# Patient Record
Sex: FEMALE | Race: WHITE | Employment: UNEMPLOYED | URBAN - METROPOLITAN AREA
[De-identification: names, ages, dates, MRNs, and addresses within clinical notes are randomized per-mention and may not be internally consistent; named-entity substitution may affect disease eponyms.]

---

## 2021-08-23 ENCOUNTER — OFFICE VISIT (OUTPATIENT)
Dept: HEMATOLOGY | Age: 56
End: 2021-08-23
Payer: COMMERCIAL

## 2021-08-23 ENCOUNTER — HOSPITAL ENCOUNTER (OUTPATIENT)
Dept: LAB | Age: 56
Discharge: HOME | End: 2021-08-23
Payer: COMMERCIAL

## 2021-08-23 VITALS
BODY MASS INDEX: 26.96 KG/M2 | OXYGEN SATURATION: 96 % | DIASTOLIC BLOOD PRESSURE: 88 MMHG | WEIGHT: 146.5 LBS | HEIGHT: 62 IN | TEMPERATURE: 98.2 F | HEART RATE: 67 BPM | SYSTOLIC BLOOD PRESSURE: 123 MMHG

## 2021-08-23 DIAGNOSIS — R74.8 ELEVATED LIVER ENZYMES: Primary | ICD-10-CM

## 2021-08-23 DIAGNOSIS — R74.8 ELEVATED LIVER ENZYMES: ICD-10-CM

## 2021-08-23 PROBLEM — F14.90 COCAINE USE: Status: ACTIVE | Noted: 2021-08-23

## 2021-08-23 PROBLEM — Z98.890 H/O CERVICAL SPINE SURGERY: Status: ACTIVE | Noted: 2021-08-23

## 2021-08-23 PROBLEM — F32.A DEPRESSION: Status: ACTIVE | Noted: 2021-08-23

## 2021-08-23 PROBLEM — Z90.710 S/P TAH (TOTAL ABDOMINAL HYSTERECTOMY): Status: ACTIVE | Noted: 2021-08-23

## 2021-08-23 PROBLEM — G90.50 RSD (REFLEX SYMPATHETIC DYSTROPHY): Status: ACTIVE | Noted: 2021-08-23

## 2021-08-23 PROBLEM — F41.9 ANXIETY: Status: ACTIVE | Noted: 2021-08-23

## 2021-08-23 PROBLEM — K90.0 CELIAC DISEASE: Status: ACTIVE | Noted: 2021-08-23

## 2021-08-23 PROBLEM — Z98.890 H/O ACHILLES TENDON REPAIR: Status: ACTIVE | Noted: 2021-08-23

## 2021-08-23 LAB
ALBUMIN SERPL-MCNC: 4.2 G/DL (ref 3.4–5)
ALBUMIN/GLOB SERPL: 1.4 {RATIO} (ref 0.8–1.7)
ALP SERPL-CCNC: 75 U/L (ref 45–117)
ALT SERPL-CCNC: 30 U/L (ref 13–56)
ANION GAP SERPL CALC-SCNC: 5 MMOL/L (ref 3–18)
AST SERPL-CCNC: 118 U/L (ref 10–38)
BASOPHILS # BLD: 0 K/UL (ref 0–0.1)
BASOPHILS NFR BLD: 0 % (ref 0–2)
BILIRUB DIRECT SERPL-MCNC: <0.1 MG/DL (ref 0–0.2)
BILIRUB SERPL-MCNC: 0.4 MG/DL (ref 0.2–1)
BUN SERPL-MCNC: 12 MG/DL (ref 7–18)
BUN/CREAT SERPL: 15 (ref 12–20)
CALCIUM SERPL-MCNC: 9.5 MG/DL (ref 8.5–10.1)
CHLORIDE SERPL-SCNC: 105 MMOL/L (ref 100–111)
CK SERPL-CCNC: 93 U/L (ref 26–192)
CO2 SERPL-SCNC: 28 MMOL/L (ref 21–32)
CREAT SERPL-MCNC: 0.8 MG/DL (ref 0.6–1.3)
DIFFERENTIAL METHOD BLD: ABNORMAL
EOSINOPHIL # BLD: 0.2 K/UL (ref 0–0.4)
EOSINOPHIL NFR BLD: 3 % (ref 0–5)
ERYTHROCYTE [DISTWIDTH] IN BLOOD BY AUTOMATED COUNT: 15.9 % (ref 11.6–14.5)
FERRITIN SERPL-MCNC: 25 NG/ML (ref 8–388)
GLOBULIN SER CALC-MCNC: 3 G/DL (ref 2–4)
GLUCOSE SERPL-MCNC: 82 MG/DL (ref 74–99)
HCT VFR BLD AUTO: 45.9 % (ref 35–45)
HGB BLD-MCNC: 15 G/DL (ref 12–16)
IRON SATN MFR SERPL: 65 % (ref 20–50)
IRON SERPL-MCNC: 218 UG/DL (ref 50–175)
LYMPHOCYTES # BLD: 2.4 K/UL (ref 0.9–3.6)
LYMPHOCYTES NFR BLD: 31 % (ref 21–52)
MCH RBC QN AUTO: 29.9 PG (ref 24–34)
MCHC RBC AUTO-ENTMCNC: 32.7 G/DL (ref 31–37)
MCV RBC AUTO: 91.6 FL (ref 74–97)
MONOCYTES # BLD: 0.8 K/UL (ref 0.05–1.2)
MONOCYTES NFR BLD: 10 % (ref 3–10)
NEUTS SEG # BLD: 4.3 K/UL (ref 1.8–8)
NEUTS SEG NFR BLD: 55 % (ref 40–73)
PLATELET # BLD AUTO: 318 K/UL (ref 135–420)
PMV BLD AUTO: 10.5 FL (ref 9.2–11.8)
POTASSIUM SERPL-SCNC: 4.2 MMOL/L (ref 3.5–5.5)
PROT SERPL-MCNC: 7.2 G/DL (ref 6.4–8.2)
RBC # BLD AUTO: 5.01 M/UL (ref 4.2–5.3)
SODIUM SERPL-SCNC: 138 MMOL/L (ref 136–145)
TIBC SERPL-MCNC: 336 UG/DL (ref 250–450)
WBC # BLD AUTO: 7.8 K/UL (ref 4.6–13.2)

## 2021-08-23 PROCEDURE — 86038 ANTINUCLEAR ANTIBODIES: CPT

## 2021-08-23 PROCEDURE — 86706 HEP B SURFACE ANTIBODY: CPT

## 2021-08-23 PROCEDURE — 86708 HEPATITIS A ANTIBODY: CPT

## 2021-08-23 PROCEDURE — 86803 HEPATITIS C AB TEST: CPT

## 2021-08-23 PROCEDURE — 82728 ASSAY OF FERRITIN: CPT

## 2021-08-23 PROCEDURE — 82103 ALPHA-1-ANTITRYPSIN TOTAL: CPT

## 2021-08-23 PROCEDURE — 82550 ASSAY OF CK (CPK): CPT

## 2021-08-23 PROCEDURE — 86704 HEP B CORE ANTIBODY TOTAL: CPT

## 2021-08-23 PROCEDURE — 80307 DRUG TEST PRSMV CHEM ANLYZR: CPT

## 2021-08-23 PROCEDURE — 80076 HEPATIC FUNCTION PANEL: CPT

## 2021-08-23 PROCEDURE — 99203 OFFICE O/P NEW LOW 30 MIN: CPT | Performed by: INTERNAL MEDICINE

## 2021-08-23 PROCEDURE — 36415 COLL VENOUS BLD VENIPUNCTURE: CPT

## 2021-08-23 PROCEDURE — 80349 CANNABINOIDS NATURAL: CPT

## 2021-08-23 PROCEDURE — 83540 ASSAY OF IRON: CPT

## 2021-08-23 PROCEDURE — 80048 BASIC METABOLIC PNL TOTAL CA: CPT

## 2021-08-23 PROCEDURE — 83516 IMMUNOASSAY NONANTIBODY: CPT

## 2021-08-23 PROCEDURE — 85025 COMPLETE CBC W/AUTO DIFF WBC: CPT

## 2021-08-23 PROCEDURE — 80353 DRUG SCREENING COCAINE: CPT

## 2021-08-23 PROCEDURE — 87340 HEPATITIS B SURFACE AG IA: CPT

## 2021-08-23 RX ORDER — ESOMEPRAZOLE MAGNESIUM 40 MG/1
40 CAPSULE, DELAYED RELEASE ORAL DAILY
COMMUNITY

## 2021-08-23 RX ORDER — CITALOPRAM 20 MG/1
20 TABLET, FILM COATED ORAL DAILY
COMMUNITY

## 2021-08-23 RX ORDER — PRAMIPEXOLE DIHYDROCHLORIDE 1 MG/1
TABLET ORAL
COMMUNITY
Start: 2021-07-20

## 2021-08-23 NOTE — PROGRESS NOTES
Keegan Roberts MD, Suzanne Urbano MD, MPH      Lella Siemens, PALISSETH Cisneros, ACNP-BC     Willow Deluna, AGPCNP-BC   Houstonconnie Wallace, FNP-SURENDRA Clarke, [facility]       Sina Haji Jayesh De Chang 136    at [address], [address] Areli Greenwood  22.    [phone]    FAX: [address] [place] [address]    at [address], [address], [address]    [phone] 35[facility]: [phone]       Patient Care Team:  Ian Merlin, MD as PCP - General (Family Medicine)      Problem List  Date Reviewed: 8/23/2021        Codes Class Noted    Cocaine use ICD-10-CM: F14.90  ICD-9-CM: 305.60  8/23/2021        Elevated liver enzymes ICD-10-CM: R74.8  ICD-9-CM: 790.5  8/23/2021        Celiac disease ICD-10-CM: K90.0  ICD-9-CM: 579.0  8/23/2021        Anxiety ICD-10-CM: F41.9  ICD-9-CM: 300.00  8/23/2021        Depression ICD-10-CM: F32.9  ICD-9-CM: 311  8/23/2021        RSD (reflex sympathetic dystrophy) ICD-10-CM: G90.50  ICD-9-CM: 337.20  8/23/2021        H/O cervical spine surgery ICD-10-CM: Z98.890  ICD-9-CM: V45.89  8/23/2021        H/O Achilles tendon repair ICD-10-CM: Y42.419  ICD-9-CM: V45.89  8/23/2021        S/P ERIKA (total abdominal hysterectomy) ICD-10-CM: Z90.710  ICD-9-CM: V88.01  8/23/2021              The clinicians listed above have asked me to see Tejas Ramachandran in consultation regarding elevated liver enzymes and its management. All medical records sent by the referring physicians were reviewed including imaging studies     The patient is a 48 y.o.  female who was found to have elevated liver transaminases in 10/2020.       Serologic evaluation for markers of chronic liver disease has either not been performed or the results are not available. She has been caring for her ill mother in hospice. Because of the stress of this she utilized Cocaine, THC, and metamphetamines. Ultrasound of the liver was performed in 5/2021. The results of the imaging demonstrated a normal appearing liver. An assessment of liver fibrosis with biopsy, Fibroscan or elastography has not been performed. The patient has the following symptoms which could be attributed to the liver disorder:    fatigue,   fevers,   pain in the right side over the liver,     The patient is not experiencing the following symptoms which are commonly seen in this liver disorder:   nausea,   vomiting,     The patient has Mild limitations in functional activities which can be attributed to the liver disease       ASSESSMENT AND PLAN:  Elevated liver enzymes  Persistent elevation in liver transaminases of unclear etiology at this time. Have performed laboratory testing to monitor liver function and degree of liver injury. This included BMP, hepatic panel, CBC with platelet count, INR. AST is elevated. ALT is normal.  ALP is normal.  Liver function is normal.  The platelet count is   normal.      Based upon laboratory studies and imaging the patient does not appear to have significant liver injury. Serologic testing for causes of chronic liver disease was ordered. All was negative     The need to perform an assessment of liver fibrosis was discussed with the patient. The Fibroscan can assess liver fibrosis and determine if a patient has advanced fibrosis or cirrhosis without the need for liver biopsy. This will be performed at the next office visit. If the Fibroscan suggests advanced fibrosis then a liver biopsy should be considered. The Fibroscan can be repeated annually or as often as clinically indicated to assess for fibrosis progression and/or regression.     If the Fibroscan value is low and the liver enzymes remain intermittently or become persistently elevated and /or the Fibroscan increases over the next 1-2 years than a liver biopsy should be considered. Screening for Hepatocellular Carcinoma  HCC screening is not necessary if the patient has no evidence of cirrhosis. Treatment of other medical problems in patients with chronic liver disease  There are no contraindications for the patient to take most medications that are necessary for treatment of other medical issues. Counseling for alcohol in patients with chronic liver disease  The patient was counseled regarding alcohol consumption and the effect of alcohol on chronic liver disease. The patient does not consume any significant amount of alcohol. Vaccinations   Vaccination for viral hepatitis A and B is recommended since the patient has no serologic evidence of previous exposure or vaccination with immunity. Routine vaccinations against other bacterial and viral agents can be performed as indicated. Annual flu vaccination should be administered if indicated. ALLERGIES  Allergies   Allergen Reactions    Gluten Other (comments)     Celiac's disease    Sulfa (Sulfonamide Antibiotics) Swelling       MEDICATIONS  Current Outpatient Medications   Medication Sig    esomeprazole (NEXIUM) 40 mg capsule Take 40 mg by mouth daily.  citalopram (CELEXA) 20 mg tablet Take 20 mg by mouth daily.  pramipexole (MIRAPEX) 1 mg tablet TAKE 1/2 TABLET BY MOUTH AT BEDTIME    linaclotide (LINZESS PO) Take  by mouth. No current facility-administered medications for this visit. SYSTEM REVIEW NOT RELATED TO LIVER DISEASE OR REVIEWED ABOVE:  Constitution systems: Negative for fever, chills, weight gain, weight loss. Eyes: Negative for visual changes. ENT: Negative for sore throat, painful swallowing. Respiratory: Negative for cough, hemoptysis, SOB. Cardiology: Negative for chest pain, palpitations. GI:  Negative for constipation or diarrhea.   : Negative for urinary frequency, dysuria, hematuria, nocturia. Skin: Negative for rash. Hematology: Negative for easy bruising, blood clots. Musculo-skelatal: Negative for back pain, muscle pain, weakness. Neurologic: Negative for headaches, dizziness, vertigo, memory problems not related to HE. Psychology: Negative for anxiety, depression. FAMILY HISTORY:  The father  of lymphoma. The mother  of COPD. There is no family history of liver disease. SOCIAL HISTORY:  The patient is . The patient has 2 children, and 2 grandchildren. The patient currently smokes 1 pack of tobacco daily. The patient has never consumed significant amounts of alcohol. The patient does not work outside the home. PHYSICAL EXAMINATION:  Visit Vitals  /88   Pulse 67   Temp 98.2 °F (36.8 °C) (Tympanic)   Ht 5' 2\" (1.575 m)   Wt 146 lb 8 oz (66.5 kg)   SpO2 96%   BMI 26.80 kg/m²     General: No acute distress. Eyes: Sclera anicteric. ENT: No oral lesions. Thyroid normal.  Nodes: No adenopathy. Skin: No spider angiomata. No jaundice. No palmar erythema. Respiratory: Lungs clear to auscultation. Cardiovascular: Regular heart rate. No murmurs. No JVD. Abdomen: Soft non-tender. Liver size normal to percussion/palpation. Spleen not palpable. No obvious ascites. Extremities: No edema. No muscle wasting. No gross arthritic changes. Neurologic: Alert and oriented. Cranial nerves grossly intact. No asterixis.     LABORATORY STUDIES:  Liver [place] of [address] Units 2021   WBC 4.6 - 13.2 K/uL 7.8   ANC 1.8 - 8.0 K/UL 4.3   HGB 12.0 - 16.0 g/dL 15.0    - 420 K/uL 318   AST 10 - 38 U/L 118 (H)   ALT 13 - 56 U/L 30   Alk Phos 45 - 117 U/L 75   Bili, Total 0.2 - 1.0 MG/DL 0.4   Bili, Direct 0.0 - 0.2 MG/DL <0.1   Albumin 3.4 - 5.0 g/dL 4.2   BUN 7.0 - 18 MG/DL 12   Creat 0.6 - 1.3 MG/DL 0.80   Na 136 - 145 mmol/L 138   K 3.5 - 5.5 mmol/L 4.2   Cl 100 - 111 mmol/L 105 CO2 21 - 32 mmol/L 28   Glucose 74 - 99 mg/dL 82     SEROLOGIES:  Serologies Latest Ref Rng & Units 8/23/2021   Hep A Ab, Total Negative   Negative   Hep B Surface Ag <1.00 Index <0.10   Hep B Surface Ag Interp NEG   Negative   Hep B Core Ab, Total Negative   Negative   Hep B Surface Ab >10.0 mIU/mL <3.10 (L)   Hep B Surface Ab Interp POS   Negative (A)   Hep C Ab 0.0 - 0.9 s/co ratio <0.1   Ferritin 8 - 388 NG/ML 25   Iron % Saturation 20 - 50 % 65 (H)   DOMITILA, IFA  Positive (A)   DOMITILA Pattern  1:80   ASMCA 0 - 19 Units 7   Alpha-1 antitrypsin level 101 - 187 mg/dL 138     LIVER HISTOLOGY:  Not available or performed    ENDOSCOPIC PROCEDURES:  Not available or performed    RADIOLOGY:  5/2021. Ultrasound of liver. Normal appearing liver. No liver mass lesions. OTHER TESTING:  Not available or performed    FOLLOW-UP:  All of the issues listed above in the Assessment and Plan were discussed with the patient. All questions were answered. The patient expressed a clear understanding of the above. [facility] 87 in 4 weeks for Fibroscan to review all data and determine the treatment plan.       Kris Guaman MD  [address]  [address] [address] [place] Coco Gilbert, [address]  12 [facility]

## 2021-08-23 NOTE — Clinical Note
9/13/2021    Patient: Charu Garcia   YOB: 1965   Date of Visit: 8/23/2021     Mahogany Tate MD  [facility] [address]  Via Fax: [phone]    Dear Mahogany Tate MD,      Thank you for referring Ms. Oleg Scott to [address] [place] [address],[address] for evaluation. My notes for this consultation are attached. If you have questions, please do not hesitate to call me. I look forward to following your patient along with you.       Sincerely,    Osborn Dancer, MD

## 2021-08-24 LAB
A1AT SERPL-MCNC: 138 MG/DL (ref 101–187)
HAV AB SER QL IA: NEGATIVE
HBV CORE AB SERPL QL IA: NEGATIVE
HBV SURFACE AB SER QL IA: NEGATIVE
HBV SURFACE AB SERPL IA-ACNC: <3.1 MIU/ML
HBV SURFACE AG SER QL: <0.1 INDEX
HBV SURFACE AG SER QL: NEGATIVE
HEP BS AB COMMENT,HBSAC: ABNORMAL

## 2021-08-25 LAB
ACTIN IGG SERPL-ACNC: 7 UNITS (ref 0–19)
ANA HOMOGEN TITR SER: NORMAL {TITER}
ANA TITR SER IF: POSITIVE {TITER}
HCV AB S/CO SERPL IA: <0.1 S/CO RATIO (ref 0–0.9)
HCV AB SERPL QL IA: NORMAL
NOTE:, 016270: NORMAL

## 2021-09-03 LAB
AMPHETAMINES SERPL QL SCN: NEGATIVE NG/ML
BARBITURATES SERPL QL SCN: NEGATIVE UG/ML
BENZOYLECGONINE, 808841: 381 NG/ML
CANNABIDIOL: NEGATIVE NG/ML
CANNABINOIDS CONFIRM: POSITIVE
CANNABINOIDS SERPL QL SCN: ABNORMAL NG/ML
CANNABINOL: NEGATIVE NG/ML
CARBOXY THC, 767669: 38.9 NG/ML
COCAINE CONFIRMATION,COCC: POSITIVE
COCAINE+BZE SERPL QL SCN: ABNORMAL NG/ML
COCAINE, 808839: NEGATIVE NG/ML
HYDROXY-THC: 1.6 NG/ML
OPIATES SERPL QL SCN: NEGATIVE NG/ML
OXYCODONE, 790407: NEGATIVE NG/ML
PCP SERPL QL SCN: NEGATIVE NG/ML
TETRAHYDROCANNABINOL, 809348: 3.3 NG/ML

## 2021-12-06 ENCOUNTER — OFFICE VISIT (OUTPATIENT)
Dept: HEMATOLOGY | Age: 56
End: 2021-12-06
Payer: COMMERCIAL

## 2021-12-06 VITALS
WEIGHT: 151 LBS | DIASTOLIC BLOOD PRESSURE: 62 MMHG | HEIGHT: 62 IN | HEART RATE: 64 BPM | RESPIRATION RATE: 16 BRPM | SYSTOLIC BLOOD PRESSURE: 109 MMHG | OXYGEN SATURATION: 98 % | BODY MASS INDEX: 27.79 KG/M2 | TEMPERATURE: 98 F

## 2021-12-06 DIAGNOSIS — R74.8 ELEVATED LIVER ENZYMES: Primary | ICD-10-CM

## 2021-12-06 PROCEDURE — 91200 LIVER ELASTOGRAPHY: CPT | Performed by: NURSE PRACTITIONER

## 2021-12-06 PROCEDURE — 99213 OFFICE O/P EST LOW 20 MIN: CPT | Performed by: NURSE PRACTITIONER

## 2021-12-06 NOTE — PROGRESS NOTES
Herman Rose MD, [place], Cite Flaco Bryan, Brien Quiroz MD, MPH      Hayder Webb, PA-SURENDRA Wilson, [facility]     Willow Deluna, [facility]   Rosa Du P-SURENDRA Haley, [facility]       Sina Haji Jayesh De Chang 136    at [address], [facility] Areli Greenwood  22. [phone]    FAX: [address] [place] [address]    at [address], [address]    [phone]    FAX: [phone]       Patient Care Team:  Kash Lovett MD as PCP - General (Family Medicine)      Problem List  Date Reviewed: 9/13/2021          Codes Class Noted    Cocaine use ICD-10-CM: F14.90  ICD-9-CM: 305.60  8/23/2021        Elevated liver enzymes ICD-10-CM: R74.8  ICD-9-CM: 790.5  8/23/2021        Celiac disease ICD-10-CM: K90.0  ICD-9-CM: 579.0  8/23/2021        Anxiety ICD-10-CM: F41.9  ICD-9-CM: 300.00  8/23/2021        Depression ICD-10-CM: F32. 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 The active problem list, all pertinent past medical history, medications, liver histology, radiologic findings, and laboratory findings related to the liver disorder were reviewed and discussed with the patient. The patient is a 48 y.o. female who was found to have elevated liver transaminases in 10/2020.       Serologic evaluation for markers of chronic liver disease was positive for DOMITILA. Ultrasound of the liver was performed in 5/2021. The results of the imaging demonstrated a normal appearing liver. Assessment of liver fibrosis with Fibroscan was performed in the office today. The result was 4.2 kPa which correlates with no fibrosis. The CAP score of 216 does not suggest hepatic steatosis. The patient has the following symptoms which could be attributed to the liver disorder:  fatigue, pain in the right side over the liver    The patient is not experiencing the following symptoms which are commonly seen in this liver disorder:   nausea, vomiting    The patient has mild limitations in functional activities which can be attributed to the liver disease       ASSESSMENT AND PLAN:  Elevated liver enzymes  Persistent elevation in liver transaminases of unclear etiology at this time. Will perform laboratory testing to monitor liver function and degree of liver injury. This included BMP, hepatic panel, CBC with platelet count, INR. AST is elevated. ALT is normal. ALP is normal. Liver function is normal. The platelet count is normal.      Based upon laboratory studies and imaging the patient does not appear to have significant liver injury. Serologic testing for causes of chronic liver disease was ordered. Positive DOMITILA. The need to perform an assessment of liver fibrosis was discussed with the patient. The Fibroscan can assess liver fibrosis and determine if a patient has advanced fibrosis or cirrhosis without the need for liver biopsy. The Fibroscan can be repeated annually or as often as clinically indicated to assess for fibrosis progression and/or regression. If the Fibroscan value is low and the liver enzymes remain intermittently or become persistently elevated and /or the Fibroscan increases over the next 1-2 years than a liver biopsy should be considered.     Screening for Hepatocellular Carcinoma  HCC screening is not necessary if the patient has no evidence of cirrhosis. Treatment of other medical problems in patients with chronic liver disease  There are no contraindications for the patient to take most medications that are necessary for treatment of other medical issues. Counseling for alcohol in patients with chronic liver disease  The patient was counseled regarding alcohol consumption and the effect of alcohol on chronic liver disease. The patient does not consume any significant amount of alcohol. Vaccinations   Vaccination for viral hepatitis A and B is recommended since the patient has no serologic evidence of previous exposure or vaccination with immunity. Routine vaccinations against other bacterial and viral agents can be performed as indicated. Annual flu vaccination should be administered if indicated. ALLERGIES  Allergies   Allergen Reactions    Gluten Other (comments)     Celiac's disease    Sulfa (Sulfonamide Antibiotics) Swelling       MEDICATIONS  Current Outpatient Medications   Medication Sig    esomeprazole (NEXIUM) 40 mg capsule Take 40 mg by mouth daily.  citalopram (CELEXA) 20 mg tablet Take 20 mg by mouth daily.  pramipexole (MIRAPEX) 1 mg tablet TAKE 1/2 TABLET BY MOUTH AT BEDTIME    linaclotide (LINZESS PO) Take  by mouth. No current facility-administered medications for this visit. SYSTEM REVIEW NOT RELATED TO LIVER DISEASE OR REVIEWED ABOVE:  Constitution systems: Negative for fever, chills, weight gain, weight loss. Eyes: Negative for visual changes. ENT: Negative for sore throat, painful swallowing. Respiratory: Negative for cough, hemoptysis, SOB. Cardiology: Negative for chest pain, palpitations. GI:  Negative for constipation or diarrhea. : Negative for urinary frequency, dysuria, hematuria, nocturia. Skin: Negative for rash. Hematology: Negative for easy bruising, blood clots.     Musculo-skelatal: Negative for back pain, muscle pain, weakness. Neurologic: Negative for headaches, dizziness, vertigo, memory problems not related to HE. Psychology: Negative for anxiety, depression. FAMILY HISTORY:  The father  of lymphoma. The mother  of COPD. There is no family history of liver disease. SOCIAL HISTORY:  The patient is . The patient has 2 children, and 2 grandchildren. The patient currently smokes 1 pack of tobacco daily. The patient has never consumed significant amounts of alcohol. The patient does not work outside the home. PHYSICAL EXAMINATION:  Visit Vitals  /62 (BP 1 Location: Left upper arm, BP Patient Position: Sitting, BP Cuff Size: Small adult)   Pulse 64   Temp 98 °F (36.7 °C)   Resp 16   Ht 5' 2\" (1.575 m)   Wt 151 lb (68.5 kg)   SpO2 98%   BMI 27.62 kg/m²     General: No acute distress. Eyes: Sclera anicteric. ENT: No oral lesions. Thyroid normal.  Nodes: No adenopathy. Skin: No spider angiomata. No jaundice. No palmar erythema. Respiratory: Lungs clear to auscultation. Cardiovascular: Regular heart rate. No murmurs. No JVD. Abdomen: Soft non-tender. Liver size normal to percussion/palpation. Spleen not palpable. No obvious ascites. Extremities: No edema. No muscle wasting. No gross arthritic changes. Neurologic: Alert and oriented. Cranial nerves grossly intact. No asterixis.     LABORATORY STUDIES:  Liver [place] of [address] [place] [address] 2021   WBC 4.6 - 13.2 K/uL 7.8   ANC 1.8 - 8.0 K/UL 4.3   HGB 12.0 - 16.0 g/dL 15.0    - 420 K/uL 318   AST 10 - 38 U/L 118 (H)   ALT 13 - 56 U/L 30   Alk Phos 45 - 117 U/L 75   Bili, Total 0.2 - 1.0 MG/DL 0.4   Bili, Direct 0.0 - 0.2 MG/DL <0.1   Albumin 3.4 - 5.0 g/dL 4.2   BUN 7.0 - 18 MG/DL 12   Creat 0.6 - 1.3 MG/DL 0.80   Na 136 - 145 mmol/L 138   K 3.5 - 5.5 mmol/L 4.2   Cl 100 - 111 mmol/L 105   CO2 21 - 32 mmol/L 28   Glucose 74 - 99 mg/dL 82     SEROLOGIES:  Serologies Latest Ref Rng & Units 8/23/2021   Hep A Ab, Total Negative   Negative   Hep B Surface Ag <1.00 Index <0.10   Hep B Surface Ag Interp NEG   Negative   Hep B Core Ab, Total Negative   Negative   Hep B Surface Ab >10.0 mIU/mL <3.10 (L)   Hep B Surface Ab Interp POS   Negative (A)   Hep C Ab 0.0 - 0.9 s/co ratio <0.1   Ferritin 8 - 388 NG/ML 25   Iron % Saturation 20 - 50 % 65 (H)   DOMITILA, IFA  Positive (A)   DOMITILA Pattern  1:80   ASMCA 0 - 19 Units 7   Alpha-1 antitrypsin level 101 - 187 mg/dL 138     LIVER HISTOLOGY:  12/2021. FibroScan performed at [address]. EkPa was 4.2. IQR/med 12%. . The results suggested a fibrosis level of F0. The CAP score suggests there is no significant hepatic steatosis. ENDOSCOPIC PROCEDURES:  Not available or performed    RADIOLOGY:  5/2021. Ultrasound of liver. Normal appearing liver. No liver mass lesions. OTHER TESTING:  Not available or performed    FOLLOW-UP:  All of the issues listed above in the Assessment and Plan were discussed with the patient. All questions were answered. The patient expressed a clear understanding of the above. [facility] 87 in 6 months for monitoring.        Judge Medley, ANAMARIA-BC  [address] [place] [facility] [place] [address]  [facility], [address]  [phone]

## 2022-03-18 PROBLEM — Z98.890 H/O CERVICAL SPINE SURGERY: Status: ACTIVE | Noted: 2021-08-23

## 2022-03-18 PROBLEM — F32.A DEPRESSION: Status: ACTIVE | Noted: 2021-08-23

## 2022-03-18 PROBLEM — K90.0 CELIAC DISEASE: Status: ACTIVE | Noted: 2021-08-23

## 2022-03-18 PROBLEM — F41.9 ANXIETY: Status: ACTIVE | Noted: 2021-08-23

## 2022-03-19 PROBLEM — Z98.890 H/O ACHILLES TENDON REPAIR: Status: ACTIVE | Noted: 2021-08-23

## 2022-03-19 PROBLEM — F14.90 COCAINE USE: Status: ACTIVE | Noted: 2021-08-23

## 2022-03-19 PROBLEM — R74.8 ELEVATED LIVER ENZYMES: Status: ACTIVE | Noted: 2021-08-23

## 2022-03-19 PROBLEM — Z90.710 S/P TAH (TOTAL ABDOMINAL HYSTERECTOMY): Status: ACTIVE | Noted: 2021-08-23

## 2022-03-20 PROBLEM — G90.50 RSD (REFLEX SYMPATHETIC DYSTROPHY): Status: ACTIVE | Noted: 2021-08-23

## 2023-04-12 ENCOUNTER — OFFICE VISIT (OUTPATIENT)
Age: 58
End: 2023-04-12
Payer: COMMERCIAL

## 2023-04-12 VITALS
BODY MASS INDEX: 27.04 KG/M2 | HEART RATE: 84 BPM | SYSTOLIC BLOOD PRESSURE: 123 MMHG | RESPIRATION RATE: 16 BRPM | DIASTOLIC BLOOD PRESSURE: 72 MMHG | HEIGHT: 63 IN | WEIGHT: 152.6 LBS | OXYGEN SATURATION: 98 %

## 2023-04-12 DIAGNOSIS — R74.8 ELEVATED LIVER ENZYMES: Primary | ICD-10-CM

## 2023-04-12 PROBLEM — K59.00 CONSTIPATION: Status: ACTIVE | Noted: 2023-04-12

## 2023-04-12 PROBLEM — F14.91 COCAINE USE DISORDER IN REMISSION: Status: ACTIVE | Noted: 2021-08-23

## 2023-04-12 PROCEDURE — 99213 OFFICE O/P EST LOW 20 MIN: CPT | Performed by: INTERNAL MEDICINE

## 2023-04-12 RX ORDER — CITALOPRAM 40 MG/1
40 TABLET ORAL DAILY
COMMUNITY

## 2023-04-12 RX ORDER — ATORVASTATIN CALCIUM 10 MG/1
10 TABLET, FILM COATED ORAL DAILY
COMMUNITY

## 2023-05-01 ENCOUNTER — CLINICAL DOCUMENTATION (OUTPATIENT)
Age: 58
End: 2023-05-01